# Patient Record
Sex: FEMALE | Race: WHITE | NOT HISPANIC OR LATINO | Employment: OTHER | URBAN - METROPOLITAN AREA
[De-identification: names, ages, dates, MRNs, and addresses within clinical notes are randomized per-mention and may not be internally consistent; named-entity substitution may affect disease eponyms.]

---

## 2024-10-30 ENCOUNTER — HOSPITAL ENCOUNTER (OUTPATIENT)
Dept: RADIOLOGY | Facility: EXTERNAL LOCATION | Age: 69
Discharge: HOME | End: 2024-10-30

## 2024-11-01 ENCOUNTER — PATIENT OUTREACH (OUTPATIENT)
Dept: HEMATOLOGY/ONCOLOGY | Facility: HOSPITAL | Age: 69
End: 2024-11-01
Payer: MEDICARE

## 2024-11-04 ENCOUNTER — OFFICE VISIT (OUTPATIENT)
Dept: HEMATOLOGY/ONCOLOGY | Facility: HOSPITAL | Age: 69
End: 2024-11-04
Payer: MEDICARE

## 2024-11-04 VITALS
OXYGEN SATURATION: 96 % | DIASTOLIC BLOOD PRESSURE: 60 MMHG | BODY MASS INDEX: 16.55 KG/M2 | HEART RATE: 74 BPM | RESPIRATION RATE: 20 BRPM | WEIGHT: 89.95 LBS | SYSTOLIC BLOOD PRESSURE: 106 MMHG | TEMPERATURE: 97.3 F | HEIGHT: 62 IN

## 2024-11-04 DIAGNOSIS — C79.51 SECONDARY MALIGNANT NEOPLASM OF BONE (MULTI): ICD-10-CM

## 2024-11-04 DIAGNOSIS — R54 FRAIL ELDERLY: ICD-10-CM

## 2024-11-04 DIAGNOSIS — R64 CANCER CACHEXIA (MULTI): ICD-10-CM

## 2024-11-04 DIAGNOSIS — C50.212 MALIGNANT NEOPLASM OF UPPER-INNER QUADRANT OF LEFT FEMALE BREAST: ICD-10-CM

## 2024-11-04 DIAGNOSIS — G89.3 NEOPLASM RELATED PAIN: Primary | ICD-10-CM

## 2024-11-04 PROCEDURE — 3008F BODY MASS INDEX DOCD: CPT | Performed by: INTERNAL MEDICINE

## 2024-11-04 PROCEDURE — 1160F RVW MEDS BY RX/DR IN RCRD: CPT | Performed by: INTERNAL MEDICINE

## 2024-11-04 PROCEDURE — 99215 OFFICE O/P EST HI 40 MIN: CPT | Performed by: INTERNAL MEDICINE

## 2024-11-04 PROCEDURE — 1159F MED LIST DOCD IN RCRD: CPT | Performed by: INTERNAL MEDICINE

## 2024-11-04 PROCEDURE — 1126F AMNT PAIN NOTED NONE PRSNT: CPT | Performed by: INTERNAL MEDICINE

## 2024-11-04 PROCEDURE — 99205 OFFICE O/P NEW HI 60 MIN: CPT | Performed by: INTERNAL MEDICINE

## 2024-11-04 RX ORDER — METOLAZONE 5 MG/1
5 TABLET ORAL
COMMUNITY
Start: 2024-06-04

## 2024-11-04 RX ORDER — IVERMECTIN 3 MG/1
3 TABLET ORAL
COMMUNITY
Start: 2024-04-15

## 2024-11-04 RX ORDER — POTASSIUM CHLORIDE 20 MEQ/1
20 TABLET, EXTENDED RELEASE ORAL
COMMUNITY
Start: 2022-12-15

## 2024-11-04 RX ORDER — IBUPROFEN 400 MG/1
TABLET ORAL
COMMUNITY
Start: 2023-04-27

## 2024-11-04 RX ORDER — ONDANSETRON 4 MG/1
4 TABLET, FILM COATED ORAL
COMMUNITY
Start: 2024-04-15

## 2024-11-04 RX ORDER — BACLOFEN 20 MG
TABLET ORAL
COMMUNITY
Start: 2022-10-31 | End: 2025-03-06

## 2024-11-04 RX ORDER — SILVER SULFADIAZINE 10 G/1000G
CREAM TOPICAL
COMMUNITY
Start: 2023-03-16

## 2024-11-04 ASSESSMENT — ENCOUNTER SYMPTOMS
OCCASIONAL FEELINGS OF UNSTEADINESS: 1
LOSS OF SENSATION IN FEET: 1
DEPRESSION: 0

## 2024-11-04 ASSESSMENT — PAIN SCALES - GENERAL: PAINLEVEL_OUTOF10: 0-NO PAIN

## 2024-11-06 PROBLEM — C50.212 MALIGNANT NEOPLASM OF UPPER-INNER QUADRANT OF LEFT FEMALE BREAST: Status: ACTIVE | Noted: 2024-11-06

## 2024-11-06 ASSESSMENT — ENCOUNTER SYMPTOMS
CHILLS: 0
RESPIRATORY NEGATIVE: 1
APPETITE CHANGE: 0
COUGH: 0
UNEXPECTED WEIGHT CHANGE: 1
SEIZURES: 0
ARTHRALGIAS: 0
NERVOUS/ANXIOUS: 1
NUMBNESS: 1
CARDIOVASCULAR NEGATIVE: 1
WHEEZING: 0
ABDOMINAL PAIN: 1
CONSTIPATION: 0
SLEEP DISTURBANCE: 0
BLOOD IN STOOL: 0
HEMOPTYSIS: 0
DIFFICULTY URINATING: 0
EYE PROBLEMS: 0
DIAPHORESIS: 0
SCLERAL ICTERUS: 0
BACK PAIN: 1
FREQUENCY: 0
DIZZINESS: 0
PALPITATIONS: 0
DYSURIA: 0
NAUSEA: 1
MYALGIAS: 0
BRUISES/BLEEDS EASILY: 1
HOT FLASHES: 0
EXTREMITY WEAKNESS: 0
DEPRESSION: 0
FEVER: 0
LEG SWELLING: 0
FATIGUE: 1
HEMATURIA: 0
DIARRHEA: 0
HEADACHES: 0
EYES NEGATIVE: 1
CONFUSION: 0
ABDOMINAL DISTENTION: 1
SHORTNESS OF BREATH: 0
ADENOPATHY: 0
CHEST TIGHTNESS: 0

## 2024-11-06 NOTE — PROGRESS NOTES
"  [facility]  Location: [facility]      BREAST CANCER DIAGNOSIS  Malignant neoplasm of upper-inner quadrant of left female breast, Pathologic: Stage IV (pM1, G3, ER-, OH-, HER2-)       ONCOLOGIC HISTORY from Dr Pace's note at [facility]          CURRENT THERAPY  Off all therapy. Alpelisib stopped due to significant toxicities 3-4 wks ago (on 150 mg every day).     HISTORY OF PRESENT ILLNESS    Blanca Simpson is a 69 y.o. woman here with  for a 2nd opinion. She's taking ivermectin now but is here to explore options. Her performance status is very borderline as she's spending more than half of her time sitting in a chair or in bed. She's not ready to \"give up\" and wants to fight and cure the cancer. Most recent biopsy showed ER very low 1-2%, OH neg, and HER2 neg. Has been on multiple prior lines including vinorelbine, sacituzumab, capecitabine, etc.            Review of Systems   Constitutional:  Positive for fatigue and unexpected weight change (wt loss). Negative for appetite change, chills, diaphoresis and fever.   HENT:  Negative.  Negative for hearing loss and lump/mass.    Eyes: Negative.  Negative for eye problems and icterus.   Respiratory: Negative.  Negative for chest tightness, cough, hemoptysis, shortness of breath and wheezing.    Cardiovascular: Negative.  Negative for chest pain, leg swelling and palpitations.   Gastrointestinal:  Positive for abdominal distention, abdominal pain and nausea. Negative for blood in stool, constipation and diarrhea.   Endocrine: Negative for hot flashes.   Genitourinary:  Negative for bladder incontinence, difficulty urinating, dyspareunia, dysuria, frequency and hematuria.    Musculoskeletal:  Positive for back pain and gait problem (difficulties ambulating due to neuropathy). Negative for arthralgias and myalgias.   Neurological:  Positive for gait problem (difficulties ambulating due to neuropathy) and numbness (grade 2-3 neuropathy). Negative " "for dizziness, extremity weakness, headaches and seizures.   Hematological:  Negative for adenopathy. Bruises/bleeds easily.   Psychiatric/Behavioral:  Negative for confusion, depression and sleep disturbance. The patient is nervous/anxious.    All other systems reviewed and are negative.        Past Medical History:  has no past medical history on file.  Surgical History:   has no past surgical history on file.  Social History:   reports that she has never smoked. She has never been exposed to tobacco smoke. She has never used smokeless tobacco.  Family History:  No family history on file.  Family Oncology History:  Cancer-related family history is not on file.      OBJECTIVE    VS / Pain:  /60   Pulse 74   Temp 36.3 °C (97.3 °F) (Temporal)   Resp 20   Ht 1.572 m (5' 1.89\")   Wt (!) 40.8 kg (89 lb 15.2 oz)   SpO2 96%   BMI 16.51 kg/m²   BSA: 1.33 meters squared   Pain Scale: 4    Performance Status:  The ECOG performance scale today is ECO-3 Ambulatory and  capable of all selfcare; unable to carry out work activities.  Up and about <= 50% of waking hrs.    Physical Exam  Constitutional:       General: She is not in acute distress.     Appearance: She is not ill-appearing, toxic-appearing or diaphoretic.      Comments: +cachectic   HENT:      Nose: No congestion or rhinorrhea.      Mouth/Throat:      Pharynx: No posterior oropharyngeal erythema.   Eyes:      Pupils: Pupils are equal, round, and reactive to light.   Cardiovascular:      Rate and Rhythm: Normal rate and regular rhythm.      Pulses: Normal pulses.      Heart sounds: Normal heart sounds. No murmur heard.     No gallop.   Pulmonary:      Breath sounds: Normal breath sounds.   Abdominal:      General: There is no distension.      Palpations: There is no mass.      Tenderness: There is no abdominal tenderness.   Musculoskeletal:         General: No swelling.      Cervical back: No rigidity.      Right lower leg: No edema.      Left lower " leg: No edema.   Lymphadenopathy:      Cervical: No cervical adenopathy.   Skin:     General: Skin is warm.      Coloration: Skin is not cyanotic.      Findings: No bruising, ecchymosis or erythema.      Comments: +ecchymoses   Neurological:      General: No focal deficit present.      Mental Status: She is oriented to person, place, and time.      Cranial Nerves: Cranial nerves 2-12 are intact.      Motor: Motor function is intact.      Gait: Gait abnormal.   Psychiatric:         Attention and Perception: Attention and perception normal.         Mood and Affect: Mood and affect normal.         Behavior: Behavior normal.         Thought Content: Thought content normal.         Judgment: Judgment normal.           Diagnostic Results         No results found for this or any previous visit from the past 365 days.     No images are attached to the encounter.    LABORATORY/PATHOLOGY DATA    No visits with results within 6 Week(s) from this visit.   Latest known visit with results is:   No results found for any previous visit.      Outside records reviewed        IMPRESSION/PLAN    Metastatic breast cancer, previously ER+ lobular and now based on recent biopsy essentially TNBC (ER 1-2%+). Very marginal performance status. Patient not interested in coming to [facility] and travelling here was very difficult for her. She's not a good clinical trial candidate and isn't eligible for any trials here. My recommendation would be to hold off on treatment and see if her PS improves. If it does, she could receive palliative doxil q4 wks. Needs a baseline echo. Prior LVEF was ok. She has significant neuropathy so I would stay away with any chemotherapy agents which have neuropathy as a potential toxicity. We discussed goals of care and that liklihood of response to >=5th line chemo is likely <=10% and any response would be likely in the order of a few months. We discussed that she's nearing the point where the best option will be  hospice/palliative care. I have discussed recommendations with Dr Pace and patient can follow-up with me on PRN basis.      We discussed the clinical significance of diagnosis, goals of care and treatment plan in detail.     I personally spent over half of a total 60 minutes face to face with the patient in counseling and discussion and/or coordination of care as described above.         -------------------------------------------------------------------------------------------------------------------------------  Ilya Escalante MD  Director of Breast Cancer Medical Oncology Research Program   [facility]  Professor of Medicine  [facility] [place] [address], [address]  [place] [id number]  Phone: [phone]  [email or web address]